# Patient Record
Sex: FEMALE | Race: WHITE
[De-identification: names, ages, dates, MRNs, and addresses within clinical notes are randomized per-mention and may not be internally consistent; named-entity substitution may affect disease eponyms.]

---

## 2017-03-25 ENCOUNTER — HOSPITAL ENCOUNTER (EMERGENCY)
Dept: HOSPITAL 17 - PHED | Age: 37
LOS: 1 days | Discharge: HOME | End: 2017-03-26
Payer: COMMERCIAL

## 2017-03-25 VITALS — WEIGHT: 106.92 LBS | HEIGHT: 62 IN | BODY MASS INDEX: 19.68 KG/M2

## 2017-03-25 VITALS
OXYGEN SATURATION: 100 % | RESPIRATION RATE: 16 BRPM | DIASTOLIC BLOOD PRESSURE: 54 MMHG | SYSTOLIC BLOOD PRESSURE: 93 MMHG | HEART RATE: 68 BPM

## 2017-03-25 VITALS
DIASTOLIC BLOOD PRESSURE: 65 MMHG | RESPIRATION RATE: 18 BRPM | SYSTOLIC BLOOD PRESSURE: 90 MMHG | TEMPERATURE: 98.3 F | OXYGEN SATURATION: 100 % | HEART RATE: 74 BPM

## 2017-03-25 DIAGNOSIS — N76.0: ICD-10-CM

## 2017-03-25 DIAGNOSIS — N73.0: Primary | ICD-10-CM

## 2017-03-25 LAB
BACTERIA #/AREA URNS HPF: (no result) /HPF
COLOR UR: YELLOW
COMMENT (UR): (no result)
CULTURE IF INDICATED: (no result)
GLUCOSE UR STRIP-MCNC: (no result) MG/DL
HGB UR QL STRIP: (no result)
KETONES UR STRIP-MCNC: (no result) MG/DL
LEUKOCYTE ESTERASE UR QL STRIP: (no result) /HPF (ref 0–5)
NITRITE UR QL STRIP: (no result)
RBC #/AREA URNS HPF: (no result) /HPF (ref 0–3)
SP GR UR STRIP: 1.02 (ref 1–1.03)
SQUAMOUS #/AREA URNS HPF: (no result) /HPF (ref 0–5)

## 2017-03-25 PROCEDURE — 87491 CHLMYD TRACH DNA AMP PROBE: CPT

## 2017-03-25 PROCEDURE — 96375 TX/PRO/DX INJ NEW DRUG ADDON: CPT

## 2017-03-25 PROCEDURE — 84703 CHORIONIC GONADOTROPIN ASSAY: CPT

## 2017-03-25 PROCEDURE — 87210 SMEAR WET MOUNT SALINE/INK: CPT

## 2017-03-25 PROCEDURE — 96365 THER/PROPH/DIAG IV INF INIT: CPT

## 2017-03-25 PROCEDURE — 99283 EMERGENCY DEPT VISIT LOW MDM: CPT

## 2017-03-25 PROCEDURE — 96367 TX/PROPH/DG ADDL SEQ IV INF: CPT

## 2017-03-25 PROCEDURE — 87591 N.GONORRHOEAE DNA AMP PROB: CPT

## 2017-03-25 PROCEDURE — 81001 URINALYSIS AUTO W/SCOPE: CPT
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
## 2017-03-26 VITALS
DIASTOLIC BLOOD PRESSURE: 55 MMHG | OXYGEN SATURATION: 100 % | SYSTOLIC BLOOD PRESSURE: 95 MMHG | HEART RATE: 74 BPM | RESPIRATION RATE: 16 BRPM

## 2017-03-26 VITALS — DIASTOLIC BLOOD PRESSURE: 74 MMHG | SYSTOLIC BLOOD PRESSURE: 128 MMHG

## 2017-03-26 LAB
CHLAMYDIA PCR: NOT DETECTED
NEISSERIA PCR: NOT DETECTED

## 2017-03-27 ENCOUNTER — HOSPITAL ENCOUNTER (EMERGENCY)
Dept: HOSPITAL 17 - PHEFT | Age: 37
Discharge: HOME | End: 2017-03-27
Payer: COMMERCIAL

## 2017-03-27 VITALS — HEIGHT: 64 IN | WEIGHT: 104.94 LBS | BODY MASS INDEX: 17.92 KG/M2

## 2017-03-27 VITALS
SYSTOLIC BLOOD PRESSURE: 87 MMHG | DIASTOLIC BLOOD PRESSURE: 61 MMHG | RESPIRATION RATE: 16 BRPM | TEMPERATURE: 98.9 F | HEART RATE: 75 BPM | OXYGEN SATURATION: 99 %

## 2017-03-27 DIAGNOSIS — N76.0: Primary | ICD-10-CM

## 2017-03-27 PROCEDURE — 99281 EMR DPT VST MAYX REQ PHY/QHP: CPT
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
## 2017-04-22 ENCOUNTER — HOSPITAL ENCOUNTER (EMERGENCY)
Dept: HOSPITAL 17 - PHED | Age: 37
LOS: 1 days | Discharge: HOME | End: 2017-04-23
Payer: COMMERCIAL

## 2017-04-22 VITALS
TEMPERATURE: 98.1 F | OXYGEN SATURATION: 99 % | SYSTOLIC BLOOD PRESSURE: 104 MMHG | RESPIRATION RATE: 18 BRPM | HEART RATE: 77 BPM | DIASTOLIC BLOOD PRESSURE: 82 MMHG

## 2017-04-22 VITALS — HEIGHT: 63 IN | BODY MASS INDEX: 18.55 KG/M2 | WEIGHT: 104.72 LBS

## 2017-04-22 DIAGNOSIS — R06.4: ICD-10-CM

## 2017-04-22 DIAGNOSIS — F31.9: ICD-10-CM

## 2017-04-22 DIAGNOSIS — F41.9: Primary | ICD-10-CM

## 2017-04-22 DIAGNOSIS — Z79.899: ICD-10-CM

## 2017-04-22 PROCEDURE — 99283 EMERGENCY DEPT VISIT LOW MDM: CPT
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
## 2017-04-23 VITALS
DIASTOLIC BLOOD PRESSURE: 64 MMHG | SYSTOLIC BLOOD PRESSURE: 92 MMHG | HEART RATE: 85 BPM | OXYGEN SATURATION: 98 % | RESPIRATION RATE: 16 BRPM

## 2017-04-23 VITALS — RESPIRATION RATE: 16 BRPM

## 2017-05-07 ENCOUNTER — HOSPITAL ENCOUNTER (EMERGENCY)
Dept: HOSPITAL 17 - PHEFT | Age: 37
Discharge: HOME | End: 2017-05-07
Payer: COMMERCIAL

## 2017-05-07 VITALS
OXYGEN SATURATION: 100 % | TEMPERATURE: 98.2 F | RESPIRATION RATE: 15 BRPM | DIASTOLIC BLOOD PRESSURE: 61 MMHG | SYSTOLIC BLOOD PRESSURE: 104 MMHG | HEART RATE: 75 BPM

## 2017-05-07 VITALS — WEIGHT: 101.41 LBS | HEIGHT: 62 IN | BODY MASS INDEX: 18.66 KG/M2

## 2017-05-07 DIAGNOSIS — Z87.442: ICD-10-CM

## 2017-05-07 DIAGNOSIS — M62.830: Primary | ICD-10-CM

## 2017-05-07 DIAGNOSIS — Z87.891: ICD-10-CM

## 2017-05-07 LAB
COLOR UR: YELLOW
COMMENT (UR): (no result)
CULTURE IF INDICATED: (no result)
GLUCOSE UR STRIP-MCNC: (no result) MG/DL
HGB UR QL STRIP: (no result)
KETONES UR STRIP-MCNC: (no result) MG/DL
LEUKOCYTE ESTERASE UR QL STRIP: (no result) /HPF (ref 0–5)
METHOD OF COLLECTION: (no result)
NITRITE UR QL STRIP: (no result)
SP GR UR STRIP: 1.02 (ref 1–1.03)
SQUAMOUS #/AREA URNS HPF: (no result) /HPF (ref 0–5)

## 2017-05-07 PROCEDURE — 84703 CHORIONIC GONADOTROPIN ASSAY: CPT

## 2017-05-07 PROCEDURE — 99283 EMERGENCY DEPT VISIT LOW MDM: CPT

## 2017-05-07 PROCEDURE — 81001 URINALYSIS AUTO W/SCOPE: CPT

## 2017-05-07 PROCEDURE — 96372 THER/PROPH/DIAG INJ SC/IM: CPT
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
## 2017-05-20 ENCOUNTER — HOSPITAL ENCOUNTER (EMERGENCY)
Dept: HOSPITAL 17 - PHEFT | Age: 37
Discharge: HOME | End: 2017-05-20
Payer: MEDICAID

## 2017-05-20 VITALS — HEIGHT: 62 IN | BODY MASS INDEX: 19.47 KG/M2 | WEIGHT: 105.82 LBS

## 2017-05-20 VITALS
DIASTOLIC BLOOD PRESSURE: 70 MMHG | RESPIRATION RATE: 15 BRPM | TEMPERATURE: 98.5 F | OXYGEN SATURATION: 100 % | SYSTOLIC BLOOD PRESSURE: 103 MMHG | HEART RATE: 76 BPM

## 2017-05-20 DIAGNOSIS — N76.0: Primary | ICD-10-CM

## 2017-05-20 DIAGNOSIS — F31.9: ICD-10-CM

## 2017-05-20 DIAGNOSIS — F41.8: ICD-10-CM

## 2017-05-20 DIAGNOSIS — Z87.891: ICD-10-CM

## 2017-05-20 PROCEDURE — 99283 EMERGENCY DEPT VISIT LOW MDM: CPT
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
## 2017-09-14 ENCOUNTER — HOSPITAL ENCOUNTER (EMERGENCY)
Dept: HOSPITAL 17 - PHEFT | Age: 37
Discharge: HOME | End: 2017-09-14
Payer: SELF-PAY

## 2017-09-14 VITALS — BODY MASS INDEX: 20.28 KG/M2 | WEIGHT: 110.23 LBS | HEIGHT: 62 IN

## 2017-09-14 VITALS
RESPIRATION RATE: 16 BRPM | TEMPERATURE: 98.6 F | DIASTOLIC BLOOD PRESSURE: 52 MMHG | OXYGEN SATURATION: 98 % | HEART RATE: 76 BPM | SYSTOLIC BLOOD PRESSURE: 99 MMHG

## 2017-09-14 DIAGNOSIS — M54.5: Primary | ICD-10-CM

## 2017-09-14 PROCEDURE — 72100 X-RAY EXAM L-S SPINE 2/3 VWS: CPT

## 2017-09-14 PROCEDURE — 96372 THER/PROPH/DIAG INJ SC/IM: CPT

## 2017-09-14 PROCEDURE — 84703 CHORIONIC GONADOTROPIN ASSAY: CPT

## 2017-09-14 PROCEDURE — 99284 EMERGENCY DEPT VISIT MOD MDM: CPT
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
## 2017-09-14 NOTE — RADRPT
EXAM DATE/TIME:  09/14/2017 10:07 

 

[facility] COMPARISON:     

No previous studies available for comparison.

 

                     

INDICATIONS :     

Low back pain.

                     

 

MEDICAL HISTORY :            

slipped disc lumbar   

 

SURGICAL HISTORY :     

None.   

 

ENCOUNTER:     

Initial                                        

 

ACUITY:     

2 days      

 

PAIN SCORE:     

10/10

 

LOCATION:     

Left  low back

 

FINDINGS:     

Two view examination was performed.  There are five non-rib bearing vertebral bodies.  The vertebral 
bodies are in normal alignment without evidence of subluxation or scoliosis.  The disc spaces are sarah
ntained.  The pedicles are intact.  Bony mineralization is normal.  No fracture is identified.

 

CONCLUSION:     

Unremarkable limited examination of the lumbar spine.  

 

 

 

 Richy Young MD on September 14, 2017 at 10:24           

Board Certified Radiologist.

 This report was verified electronically.

## 2017-10-18 ENCOUNTER — HOSPITAL ENCOUNTER (EMERGENCY)
Dept: HOSPITAL 17 - PHED | Age: 37
Discharge: HOME | End: 2017-10-18
Payer: SELF-PAY

## 2017-10-18 VITALS — SYSTOLIC BLOOD PRESSURE: 86 MMHG | DIASTOLIC BLOOD PRESSURE: 53 MMHG

## 2017-10-18 VITALS
SYSTOLIC BLOOD PRESSURE: 99 MMHG | DIASTOLIC BLOOD PRESSURE: 53 MMHG | OXYGEN SATURATION: 100 % | HEART RATE: 72 BPM | TEMPERATURE: 97.8 F

## 2017-10-18 DIAGNOSIS — N12: Primary | ICD-10-CM

## 2017-10-18 DIAGNOSIS — B95.7: ICD-10-CM

## 2017-10-18 DIAGNOSIS — Z87.891: ICD-10-CM

## 2017-10-18 DIAGNOSIS — F31.9: ICD-10-CM

## 2017-10-18 LAB
BACTERIA #/AREA URNS HPF: (no result) /HPF
COLOR UR: (no result)
COMMENT (UR): (no result)
CULTURE IF INDICATED: (no result)
GLUCOSE UR STRIP-MCNC: 250 MG/DL
HGB UR QL STRIP: (no result)
KETONES UR STRIP-MCNC: 15 MG/DL
NITRITE UR QL STRIP: (no result)
RBC #/AREA URNS HPF: (no result) /HPF (ref 0–3)
SP GR UR STRIP: 1.03 (ref 1–1.03)
SQUAMOUS #/AREA URNS HPF: (no result) /HPF (ref 0–5)

## 2017-10-18 PROCEDURE — 87086 URINE CULTURE/COLONY COUNT: CPT

## 2017-10-18 PROCEDURE — 87077 CULTURE AEROBIC IDENTIFY: CPT

## 2017-10-18 PROCEDURE — 87185 SC STD ENZYME DETCJ PER NZM: CPT

## 2017-10-18 PROCEDURE — 84703 CHORIONIC GONADOTROPIN ASSAY: CPT

## 2017-10-18 PROCEDURE — 99283 EMERGENCY DEPT VISIT LOW MDM: CPT

## 2017-10-18 PROCEDURE — 87186 SC STD MICRODIL/AGAR DIL: CPT

## 2017-10-18 PROCEDURE — 81001 URINALYSIS AUTO W/SCOPE: CPT
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
## 2017-11-14 ENCOUNTER — HOSPITAL ENCOUNTER (EMERGENCY)
Dept: HOSPITAL 17 - PHED | Age: 37
LOS: 1 days | Discharge: HOME | End: 2017-11-15
Payer: SELF-PAY

## 2017-11-14 VITALS — WEIGHT: 116.4 LBS | BODY MASS INDEX: 21.42 KG/M2 | HEIGHT: 62 IN

## 2017-11-14 VITALS
HEART RATE: 67 BPM | RESPIRATION RATE: 15 BRPM | SYSTOLIC BLOOD PRESSURE: 119 MMHG | TEMPERATURE: 98.5 F | DIASTOLIC BLOOD PRESSURE: 60 MMHG

## 2017-11-14 DIAGNOSIS — N89.8: Primary | ICD-10-CM

## 2017-11-14 PROCEDURE — 87591 N.GONORRHOEAE DNA AMP PROB: CPT

## 2017-11-14 PROCEDURE — 87491 CHLMYD TRACH DNA AMP PROBE: CPT

## 2017-11-14 PROCEDURE — 87210 SMEAR WET MOUNT SALINE/INK: CPT

## 2017-11-14 PROCEDURE — 99283 EMERGENCY DEPT VISIT LOW MDM: CPT
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
## 2017-11-15 VITALS
HEART RATE: 85 BPM | SYSTOLIC BLOOD PRESSURE: 120 MMHG | TEMPERATURE: 98.2 F | RESPIRATION RATE: 16 BRPM | OXYGEN SATURATION: 97 % | DIASTOLIC BLOOD PRESSURE: 78 MMHG

## 2017-11-15 LAB
CHLAMYDIA PCR: NOT DETECTED
NEISSERIA PCR: NOT DETECTED

## 2018-01-02 ENCOUNTER — HOSPITAL ENCOUNTER (EMERGENCY)
Dept: HOSPITAL 17 - PHED | Age: 38
Discharge: HOME | End: 2018-01-02
Payer: MEDICAID

## 2018-01-02 VITALS
SYSTOLIC BLOOD PRESSURE: 99 MMHG | RESPIRATION RATE: 16 BRPM | DIASTOLIC BLOOD PRESSURE: 56 MMHG | OXYGEN SATURATION: 100 % | HEART RATE: 87 BPM | TEMPERATURE: 98 F

## 2018-01-02 VITALS — BODY MASS INDEX: 20.37 KG/M2 | WEIGHT: 110.67 LBS | HEIGHT: 62 IN

## 2018-01-02 DIAGNOSIS — B95.7: ICD-10-CM

## 2018-01-02 DIAGNOSIS — F17.210: ICD-10-CM

## 2018-01-02 DIAGNOSIS — N39.0: Primary | ICD-10-CM

## 2018-01-02 DIAGNOSIS — I10: ICD-10-CM

## 2018-01-02 LAB
AMORPHOUS SEDIMENT, URINE: (no result)
BACTERIA #/AREA URNS HPF: (no result) /HPF
COLOR UR: YELLOW
GLUCOSE UR STRIP-MCNC: (no result) MG/DL
HGB UR QL STRIP: (no result)
KETONES UR STRIP-MCNC: 15 MG/DL
LEUKOCYTE ESTERASE UR QL STRIP: (no result) /HPF (ref 0–5)
MUCOUS THREADS #/AREA URNS LPF: (no result) /LPF
NITRITE UR QL STRIP: (no result)
SP GR UR STRIP: 1.02 (ref 1–1.03)
SQUAMOUS #/AREA URNS HPF: (no result) /HPF (ref 0–5)
URINE LEUKOCYTE ESTERASE: (no result)
WHITE BLOOD CELL CLUMPS: (no result)

## 2018-01-02 PROCEDURE — 86403 PARTICLE AGGLUT ANTBDY SCRN: CPT

## 2018-01-02 PROCEDURE — 84703 CHORIONIC GONADOTROPIN ASSAY: CPT

## 2018-01-02 PROCEDURE — 99284 EMERGENCY DEPT VISIT MOD MDM: CPT

## 2018-01-02 PROCEDURE — 87086 URINE CULTURE/COLONY COUNT: CPT

## 2018-01-02 PROCEDURE — 87186 SC STD MICRODIL/AGAR DIL: CPT

## 2018-01-02 PROCEDURE — 87077 CULTURE AEROBIC IDENTIFY: CPT

## 2018-01-02 PROCEDURE — 81001 URINALYSIS AUTO W/SCOPE: CPT
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
## 2018-01-23 ENCOUNTER — HOSPITAL ENCOUNTER (EMERGENCY)
Dept: HOSPITAL 17 - PHED | Age: 38
Discharge: HOME | End: 2018-01-23
Payer: MEDICAID

## 2018-01-23 VITALS
RESPIRATION RATE: 18 BRPM | HEART RATE: 78 BPM | DIASTOLIC BLOOD PRESSURE: 62 MMHG | OXYGEN SATURATION: 99 % | SYSTOLIC BLOOD PRESSURE: 99 MMHG

## 2018-01-23 VITALS
HEART RATE: 96 BPM | TEMPERATURE: 97.6 F | RESPIRATION RATE: 14 BRPM | DIASTOLIC BLOOD PRESSURE: 59 MMHG | OXYGEN SATURATION: 100 % | SYSTOLIC BLOOD PRESSURE: 101 MMHG

## 2018-01-23 VITALS — HEIGHT: 62 IN | WEIGHT: 111.55 LBS | BODY MASS INDEX: 20.53 KG/M2

## 2018-01-23 VITALS — SYSTOLIC BLOOD PRESSURE: 96 MMHG | DIASTOLIC BLOOD PRESSURE: 58 MMHG

## 2018-01-23 VITALS — RESPIRATION RATE: 18 BRPM

## 2018-01-23 DIAGNOSIS — N39.0: Primary | ICD-10-CM

## 2018-01-23 DIAGNOSIS — F17.200: ICD-10-CM

## 2018-01-23 LAB
ALBUMIN SERPL-MCNC: 3.3 GM/DL (ref 3.4–5)
ALP SERPL-CCNC: 61 U/L (ref 45–117)
ALT SERPL-CCNC: 19 U/L (ref 10–53)
AST SERPL-CCNC: 17 U/L (ref 15–37)
BACTERIA #/AREA URNS HPF: (no result) /HPF
BASOPHILS # BLD AUTO: 0 TH/MM3 (ref 0–0.2)
BASOPHILS NFR BLD: 0.3 % (ref 0–2)
BILIRUB SERPL-MCNC: 0.6 MG/DL (ref 0.2–1)
BUN SERPL-MCNC: 17 MG/DL (ref 7–18)
CALCIUM SERPL-MCNC: 8.5 MG/DL (ref 8.5–10.1)
CHLORIDE SERPL-SCNC: 110 MEQ/L (ref 98–107)
COLOR UR: YELLOW
CREAT SERPL-MCNC: 0.73 MG/DL (ref 0.5–1)
EOSINOPHIL # BLD: 0.1 TH/MM3 (ref 0–0.4)
EOSINOPHIL NFR BLD: 0.6 % (ref 0–4)
ERYTHROCYTE [DISTWIDTH] IN BLOOD BY AUTOMATED COUNT: 12.1 % (ref 11.6–17.2)
GFR SERPLBLD BASED ON 1.73 SQ M-ARVRAT: 91 ML/MIN (ref 89–?)
GLUCOSE SERPL-MCNC: 84 MG/DL (ref 74–106)
GLUCOSE UR STRIP-MCNC: (no result) MG/DL
HCO3 BLD-SCNC: 24.6 MEQ/L (ref 21–32)
HCT VFR BLD CALC: 36.3 % (ref 35–46)
HGB BLD-MCNC: 11.9 GM/DL (ref 11.6–15.3)
HGB UR QL STRIP: (no result)
KETONES UR STRIP-MCNC: (no result) MG/DL
LIPASE: 135 U/L (ref 73–393)
LYMPHOCYTES # BLD AUTO: 0.9 TH/MM3 (ref 1–4.8)
LYMPHOCYTES NFR BLD AUTO: 9.8 % (ref 9–44)
MCH RBC QN AUTO: 31.2 PG (ref 27–34)
MCHC RBC AUTO-ENTMCNC: 32.8 % (ref 32–36)
MCV RBC AUTO: 95.2 FL (ref 80–100)
MONOCYTE #: 0.4 TH/MM3 (ref 0–0.9)
MONOCYTES NFR BLD: 4.3 % (ref 0–8)
NEUTROPHILS # BLD AUTO: 8.3 TH/MM3 (ref 1.8–7.7)
NEUTROPHILS NFR BLD AUTO: 85 % (ref 16–70)
NITRITE UR QL STRIP: (no result)
PLATELET # BLD: 174 TH/MM3 (ref 150–450)
PMV BLD AUTO: 8.7 FL (ref 7–11)
PROT SERPL-MCNC: 6.8 GM/DL (ref 6.4–8.2)
RBC # BLD AUTO: 3.81 MIL/MM3 (ref 4–5.3)
SODIUM SERPL-SCNC: 141 MEQ/L (ref 136–145)
SP GR UR STRIP: 1.02 (ref 1–1.03)
SQUAMOUS #/AREA URNS HPF: (no result) /HPF (ref 0–5)
URINE LEUKOCYTE ESTERASE: (no result)
WBC # BLD AUTO: 9.7 TH/MM3 (ref 4–11)
WHITE BLOOD CELL CLUMPS: (no result)

## 2018-01-23 PROCEDURE — 74176 CT ABD & PELVIS W/O CONTRAST: CPT

## 2018-01-23 PROCEDURE — 99284 EMERGENCY DEPT VISIT MOD MDM: CPT

## 2018-01-23 PROCEDURE — 96361 HYDRATE IV INFUSION ADD-ON: CPT

## 2018-01-23 PROCEDURE — 87086 URINE CULTURE/COLONY COUNT: CPT

## 2018-01-23 PROCEDURE — 96375 TX/PRO/DX INJ NEW DRUG ADDON: CPT

## 2018-01-23 PROCEDURE — 85025 COMPLETE CBC W/AUTO DIFF WBC: CPT

## 2018-01-23 PROCEDURE — 96374 THER/PROPH/DIAG INJ IV PUSH: CPT

## 2018-01-23 PROCEDURE — 80053 COMPREHEN METABOLIC PANEL: CPT

## 2018-01-23 PROCEDURE — 81001 URINALYSIS AUTO W/SCOPE: CPT

## 2018-01-23 PROCEDURE — 84703 CHORIONIC GONADOTROPIN ASSAY: CPT

## 2018-01-23 PROCEDURE — 83690 ASSAY OF LIPASE: CPT

## 2018-01-23 NOTE — RADRPT
EXAM DATE/TIME:  2018 07:56 

 

[facility] COMPARISON:     

CT ABDOMEN & PELVIS W/O CONTRAST, 2016, 21:06.

 

 

INDICATIONS :     

Left flank pain with nausea. Evaluate for renal calculi.

                  

 

ORAL CONTRAST:      

No oral contrast ingested.

                  

 

RADIATION DOSE:     

5.86 CTDIvol (mGy) 

 

 

MEDICAL HISTORY :     

Hypertension.  

 

SURGICAL HISTORY :      

 section. Tubal ligation.

 

ENCOUNTER:      

Initial

 

ACUITY:      

1 day

 

PAIN SCALE:      

10/10

 

LOCATION:       

Left flank 

 

TECHNIQUE:     

Volumetric scanning of the abdomen and pelvis was performed.  Using automated exposure control and ad
justment of the mA and/or kV according to patient size, radiation dose was kept as low as reasonably 
achievable to obtain optimal diagnostic quality images.  DICOM format image data is available electro
nically for review and comparison.  

 

FINDINGS:     

 

LOWER LUNGS:     

The visualized lower lungs are clear.

 

LIVER:     

Homogeneous density without lesion.  There is no dilation of the biliary tree.  No calcified gallston
es.

 

SPLEEN:     

Normal size without lesion.

 

PANCREAS:     

Within normal limits. 

 

KIDNEYS:     

Redemonstration of bilateral hyperdense renal pyramids. Multiple 2-3 mm calcified renal calculi in th
e right kidney. There is described left sided renal calculi are not demonstrated on current exam. No 
hydronephrosis. No contour deforming abnormality.

 

ADRENAL GLANDS:     

Within normal limits.

 

VASCULAR:     

There is no aortic aneurysm.

 

BOWEL/MESENTERY:     

The stomach, small bowel, and colon demonstrate no acute abnormality.  There is no free intraperitone
al air or fluid. 

 

ABDOMINAL WALL:     

Within normal limits.

 

RETROPERITONEUM:     

There is no lymphadenopathy.

 

BLADDER:     

Nearly completely decompressed. No radiopaque bladder calculi.

 

REPRODUCTIVE:     

Uterus appears retroflexed.

 

INGUINAL:     

There is no lymphadenopathy or hernia.

 

MUSCULOSKELETAL:     

Within normal limits for patient age.

 

CONCLUSION:

     

1. Findings suggestive of medullary nephrocalcinosis with multiple 2-3 mm nonobstructing calcified ri
ght renal calculi. No radiopaque left renal calculi or obstructive uropathy.

2. No definitive findings to explain patient's left abdominal pain. 

 

 

 Dwayne Carroll MD on 2018 at 8:10           

Board Certified Radiologist.

 This report was verified electronically.

## 2018-01-23 NOTE — PD
HPI


Chief Complaint:  LEFT FLANK PAIN


Time Seen by Provider:  06:38


Travel History


International Travel<30 days:  No


Contact w/Intl Traveler<30days:  No


Traveled to known affect area:  No





History of Present Illness


HPI


PATIENT STATES LEFT FLANK PAIN ABOUT 1.5HRS AGO, ACUTE/SUDDEN ONSET, 9/10, NO 

CHANGES TO URINE COLOR/FREQUENCY/DYSURIA AT THIS POINT.  





ALL:TYLENOL,DOXY, PAXIL,DARVON


PMHX: KIDNEY STONES, PSYCH HX, HTN , CSEXNS





PFSH


Past Medical History


Bipolar Disorder:  Yes


Anxiety:  Yes (PANIC ATTACKS)


Depression:  Yes


Cancer:  No


Cardiovascular Problems:  Yes (HTN)


Diabetes:  No


Diminished Hearing:  No


Headaches:  No


Psychiatric:  Yes


Immunizations Current:  Yes


Migraines:  Yes


Seizures:  No


:  5


Para:  4


Miscarriage:  1


:  0


Tubal Ligation:  Yes





Past Surgical History


 Section:  Yes (X1)


Gynecologic Surgery:  Yes (c-sections)


Other Surgery:  Yes (L LEG RECONSTRUCTION AFTER TRAUMA )





Social History


Alcohol Use:  No


Tobacco Use:  Yes (1PPD)


Substance Use:  No





Allergies-Medications


(Allergen,Severity, Reaction):  


Coded Allergies:  


     acetaminophen (Verified  Allergy, Severe, 18)


     doxycycline (Verified  Allergy, Severe, Rash, 18)


     paroxetine (Verified  Allergy, Severe, rash, 18)


     propoxyphene (Verified  Allergy, Severe, 18)


Reported Meds & Prescriptions





Reported Meds & Active Scripts


Active


Macrobid (Nitrofurantoin Monoh/Nitrofur Macro) 100 Mg Cap 100 Mg PO BID 7 Days








Review of Systems


Except as stated in HPI:  all other systems reviewed are Neg


General / Constitutional:  No: Fever


Eyes:  No: Visual changes


HENT:  No: Headaches


Cardiovascular:  No: Chest Pain or Discomfort


Respiratory:  No: Shortness of Breath


Gastrointestinal:  No: Abdominal Pain


Genitourinary:  Positive: Flank Pain


Musculoskeletal:  No: Pain


Skin:  No Rash


Neurologic:  No: Weakness


Psychiatric:  No: Depression


Endocrine:  No: Polydipsia


Hematologic/Lymphatic:  No: Easy Bruising





Physical Exam


Narrative


GENERAL: 


SKIN: Warm and dry.


HEAD: Atraumatic. Normocephalic. 


EYES: Pupils equal and round. No scleral icterus. No injection or drainage. 


ENT: No nasal bleeding or discharge.  Mucous membranes pink and moist.


NECK: Trachea midline. No JVD. 


CARDIOVASCULAR: Regular rate and rhythm.  


RESPIRATORY: No accessory muscle use. Clear to auscultation. Breath sounds 

equal bilaterally. 


GASTROINTESTINAL: Abdomen soft, non-tender, nondistended. LEFT CVAT


MUSCULOSKELETAL: Extremities without clubbing, cyanosis, or edema. No obvious 

deformities. 


NEUROLOGICAL: Awake and alert. No obvious cranial nerve deficits.  Motor 

grossly within normal limits. Five out of 5 muscle strength in the arms and 

legs.  Normal speech.


PSYCHIATRIC: Appropriate mood and affect; insight and judgment normal.





Data


Data


Last Documented VS





Orders





 Orders


Complete Blood Count With Diff (18 06:42)


Comprehensive Metabolic Panel (18 06:42)


Urinalysis - C+S If Indicated (18 06:42)


Ed Urine Pregnancytest Poc (18 06:42)


Ct Abd/Pel W/O Iv Contrast (18 06:42)


Ecg Monitoring (18 06:42)


Iv Access Insert/Monitor (18 06:42)


Ketorolac Inj (Toradol Inj) (18 06:45)


Ondansetron Inj (Zofran Inj) (18 06:45)


Sodium Chlor 0.9% 1000 Ml Inj (Ns 1000 M (18 06:42)


Lipase (18 06:42)


Sodium Chlor 0.9% 1000 Ml Inj (Ns 1000 M (18 07:45)


Urine Culture (18 07:53)


Ed Discharge Order (18 08:41)





Labs





Laboratory Tests








Test


  18


07:00 18


07:53


 


White Blood Count 9.7 TH/MM3  


 


Red Blood Count 3.81 MIL/MM3  


 


Hemoglobin 11.9 GM/DL  


 


Hematocrit 36.3 %  


 


Mean Corpuscular Volume 95.2 FL  


 


Mean Corpuscular Hemoglobin 31.2 PG  


 


Mean Corpuscular Hemoglobin


Concent 32.8 % 


  


 


 


Red Cell Distribution Width 12.1 %  


 


Platelet Count 174 TH/MM3  


 


Mean Platelet Volume 8.7 FL  


 


Neutrophils (%) (Auto) 85.0 %  


 


Lymphocytes (%) (Auto) 9.8 %  


 


Monocytes (%) (Auto) 4.3 %  


 


Eosinophils (%) (Auto) 0.6 %  


 


Basophils (%) (Auto) 0.3 %  


 


Neutrophils # (Auto) 8.3 TH/MM3  


 


Lymphocytes # (Auto) 0.9 TH/MM3  


 


Monocytes # (Auto) 0.4 TH/MM3  


 


Eosinophils # (Auto) 0.1 TH/MM3  


 


Basophils # (Auto) 0.0 TH/MM3  


 


CBC Comment DIFF FINAL  


 


Differential Comment   


 


Blood Urea Nitrogen 17 MG/DL  


 


Creatinine 0.73 MG/DL  


 


Random Glucose 84 MG/DL  


 


Total Protein 6.8 GM/DL  


 


Albumin 3.3 GM/DL  


 


Calcium Level 8.5 MG/DL  


 


Alkaline Phosphatase 61 U/L  


 


Aspartate Amino Transf


(AST/SGOT) 17 U/L 


  


 


 


Alanine Aminotransferase


(ALT/SGPT) 19 U/L 


  


 


 


Total Bilirubin 0.6 MG/DL  


 


Sodium Level 141 MEQ/L  


 


Potassium Level 3.7 MEQ/L  


 


Chloride Level 110 MEQ/L  


 


Carbon Dioxide Level 24.6 MEQ/L  


 


Anion Gap 6 MEQ/L  


 


Estimat Glomerular Filtration


Rate 91 ML/MIN 


  


 


 


Lipase 135 U/L  


 


Urine Collection Type  CLEAN CATCH 


 


Urine Color  YELLOW 


 


Urine Turbidity  SLIGHT 


 


Urine pH  6.0 


 


Urine Specific Gravity  1.020 


 


Urine Protein  30 mg/dL 


 


Urine Glucose (UA)  NEG mg/dL 


 


Urine Ketones  NEG mg/dL 


 


Urine Occult Blood  LARGE 


 


Urine Nitrite  NEG 


 


Urine Bilirubin  NEG 


 


Urine Leukocyte Esterase  MOD 


 


Urine RBC  50-99 /hpf 


 


Urine WBC  25-49 /hpf 


 


Urine WBC Clumps  FEW 


 


Urine Squamous Epithelial


Cells 


  0-5 /hpf 


 


 


Urine Bacteria  FEW /hpf 


 


Urine Waxy Casts   /lpf 


 


Microscopic Urinalysis Comment


  


  CULTURE


INDICATED


 


Urine Collection Time  07:53 











MDM


Medical Decision Making


Medical Screen Exam Complete:  Yes


Emergency Medical Condition:  Yes


Medical Record Reviewed:  Yes


Differential Diagnosis


KIDNEY STONE V PYELO V UTI


Narrative Course


SIGN OUT TO DR NARVAEZ PENDING CT/UA/LABS AND REEVALUATION/DISPO





Diagnosis





 Primary Impression:  


 flank pain


Scripts


Nitrofurantoin Monohydrate Macrocrystals (Macrobid) 100 Mg Cap


100 MG PO BID for Infection for 7 Days, #14 CAP 0 Refills


   Prov: Yulisa Narvaez MD         18











Altaf Ness MD 2018 06:42

## 2018-01-23 NOTE — PD
Physical Exam


Narrative


GENERAL: Well-nourished, well-developed patient.


SKIN: Warm and dry.


HEAD: Normocephalic and atraumatic.


EYES: No injection or drainage. 


ENT: No nasal drainage noted. 


NECK: Supple, trachea midline.


CARDIOVASCULAR: Regular rate and rhythm 


RESPIRATORY: no increased effort. No accessory muscle use.


GASTROINTESTINAL: Abdomen nondistended. 


EXTREMITIES: No edema.


NEUROLOGICAL: Awake and alert. Motor and sensory grossly within normal limits. 

Normal speech.





Data


Data


Last Documented VS





Vital Signs








  Date Time  Temp Pulse Resp B/P (MAP) Pulse Ox O2 Delivery O2 Flow Rate FiO2


 


1/23/18 08:05   18     


 


1/23/18 07:09  78  99/62 (74) 99 Room Air  


 


1/23/18 06:03 97.6       








Orders





 Orders


Complete Blood Count With Diff (1/23/18 06:42)


Comprehensive Metabolic Panel (1/23/18 06:42)


Urinalysis - C+S If Indicated (1/23/18 06:42)


Ed Urine Pregnancytest Poc (1/23/18 06:42)


Ct Abd/Pel W/O Iv Contrast (1/23/18 06:42)


Ecg Monitoring (1/23/18 06:42)


Iv Access Insert/Monitor (1/23/18 06:42)


Ketorolac Inj (Toradol Inj) (1/23/18 06:45)


Ondansetron Inj (Zofran Inj) (1/23/18 06:45)


Sodium Chlor 0.9% 1000 Ml Inj (Ns 1000 M (1/23/18 06:42)


Lipase (1/23/18 06:42)


Sodium Chlor 0.9% 1000 Ml Inj (Ns 1000 M (1/23/18 07:45)


Urine Culture (1/23/18 07:53)


Ed Discharge Order (1/23/18 08:41)





Labs





Laboratory Tests








Test


  1/23/18


07:00 1/23/18


07:53


 


White Blood Count 9.7 TH/MM3  


 


Red Blood Count 3.81 MIL/MM3  


 


Hemoglobin 11.9 GM/DL  


 


Hematocrit 36.3 %  


 


Mean Corpuscular Volume 95.2 FL  


 


Mean Corpuscular Hemoglobin 31.2 PG  


 


Mean Corpuscular Hemoglobin


Concent 32.8 % 


  


 


 


Red Cell Distribution Width 12.1 %  


 


Platelet Count 174 TH/MM3  


 


Mean Platelet Volume 8.7 FL  


 


Neutrophils (%) (Auto) 85.0 %  


 


Lymphocytes (%) (Auto) 9.8 %  


 


Monocytes (%) (Auto) 4.3 %  


 


Eosinophils (%) (Auto) 0.6 %  


 


Basophils (%) (Auto) 0.3 %  


 


Neutrophils # (Auto) 8.3 TH/MM3  


 


Lymphocytes # (Auto) 0.9 TH/MM3  


 


Monocytes # (Auto) 0.4 TH/MM3  


 


Eosinophils # (Auto) 0.1 TH/MM3  


 


Basophils # (Auto) 0.0 TH/MM3  


 


CBC Comment DIFF FINAL  


 


Differential Comment   


 


Blood Urea Nitrogen 17 MG/DL  


 


Creatinine 0.73 MG/DL  


 


Random Glucose 84 MG/DL  


 


Total Protein 6.8 GM/DL  


 


Albumin 3.3 GM/DL  


 


Calcium Level 8.5 MG/DL  


 


Alkaline Phosphatase 61 U/L  


 


Aspartate Amino Transf


(AST/SGOT) 17 U/L 


  


 


 


Alanine Aminotransferase


(ALT/SGPT) 19 U/L 


  


 


 


Total Bilirubin 0.6 MG/DL  


 


Sodium Level 141 MEQ/L  


 


Potassium Level 3.7 MEQ/L  


 


Chloride Level 110 MEQ/L  


 


Carbon Dioxide Level 24.6 MEQ/L  


 


Anion Gap 6 MEQ/L  


 


Estimat Glomerular Filtration


Rate 91 ML/MIN 


  


 


 


Lipase 135 U/L  


 


Urine Collection Type  CLEAN CATCH 


 


Urine Color  YELLOW 


 


Urine Turbidity  SLIGHT 


 


Urine pH  6.0 


 


Urine Specific Gravity  1.020 


 


Urine Protein  30 mg/dL 


 


Urine Glucose (UA)  NEG mg/dL 


 


Urine Ketones  NEG mg/dL 


 


Urine Occult Blood  LARGE 


 


Urine Nitrite  NEG 


 


Urine Bilirubin  NEG 


 


Urine Leukocyte Esterase  MOD 


 


Urine RBC  50-99 /hpf 


 


Urine WBC  25-49 /hpf 


 


Urine WBC Clumps  FEW 


 


Urine Squamous Epithelial


Cells 


  0-5 /hpf 


 


 


Urine Bacteria  FEW /hpf 


 


Urine Waxy Casts   /lpf 


 


Microscopic Urinalysis Comment


  


  CULTURE


INDICATED


 


Urine Collection Time  07:53 











MDM


Supervised Visit with [facility]:  No


Interpretation(s)


CBC & BMP Diagram


1/23/18 07:00








Total Protein 6.8, Albumin 3.3 L, Calcium Level 8.5, Alkaline Phosphatase 61, 

Aspartate Amino Transf (AST/SGOT) 17, Alanine Aminotransferase (ALT/SGPT) 19, 

Total Bilirubin 0.6





beta is negative


CT abdomen and pelvis without ureteral stones


UA with UTI


Narrative Course


Signed over to me to follow workup and reevaluate.  Workup shows UTI, Patient 

denies any new complaints and states that they are feeling better.  Patient 

happy with care, all questions answered. Patient knows that follow up is 

incumbent on them and to return to the emergency room immediately if new or 

worsening symptoms develop. Patient given strict return precautions, vitals 

reviewed and are normal, agrees to further workup as an outpatient.


Diagnosis





 Primary Impression:  


 UTI (urinary tract infection)


 Qualified Codes:  N39.0 - Urinary tract infection, site not specified


 Additional Impression:  


 Abdominal pain


 Qualified Codes:  R10.9 - Unspecified abdominal pain


Patient Instructions:  General Instructions





***Additional Instruction:  


return as needed, follow with primary this week, tylenol as needed for pain


***Med/Other Pt SpecificInfo:  Prescription(s) given


Scripts


Nitrofurantoin Monohydrate Macrocrystals (Macrobid) 100 Mg Cap


100 MG PO BID for Infection for 7 Days, #14 CAP 0 Refills


   Prov: Yulisa Narvaez MD         1/23/18


Disposition:  01 DISCHARGE HOME


Condition:  Stable











Yulisa Narvaez MD Jan 23, 2018 08:05

## 2018-02-13 ENCOUNTER — HOSPITAL ENCOUNTER (EMERGENCY)
Dept: HOSPITAL 17 - PHED | Age: 38
Discharge: HOME | End: 2018-02-13
Payer: MEDICAID

## 2018-02-13 VITALS — DIASTOLIC BLOOD PRESSURE: 50 MMHG | SYSTOLIC BLOOD PRESSURE: 91 MMHG

## 2018-02-13 VITALS
RESPIRATION RATE: 18 BRPM | SYSTOLIC BLOOD PRESSURE: 94 MMHG | DIASTOLIC BLOOD PRESSURE: 58 MMHG | HEART RATE: 73 BPM | TEMPERATURE: 98.9 F | OXYGEN SATURATION: 100 %

## 2018-02-13 VITALS — BODY MASS INDEX: 20.49 KG/M2 | WEIGHT: 111.33 LBS | HEIGHT: 62 IN

## 2018-02-13 DIAGNOSIS — F31.9: ICD-10-CM

## 2018-02-13 DIAGNOSIS — F17.210: ICD-10-CM

## 2018-02-13 DIAGNOSIS — I10: ICD-10-CM

## 2018-02-13 DIAGNOSIS — N93.9: Primary | ICD-10-CM

## 2018-02-13 DIAGNOSIS — F41.0: ICD-10-CM

## 2018-02-13 LAB
COLOR UR: YELLOW
GLUCOSE UR STRIP-MCNC: (no result) MG/DL
HGB UR QL STRIP: (no result)
KETONES UR STRIP-MCNC: (no result) MG/DL
LEUKOCYTE ESTERASE UR QL STRIP: (no result) /HPF (ref 0–5)
NITRITE UR QL STRIP: (no result)
SP GR UR STRIP: 1.02 (ref 1–1.03)
SQUAMOUS #/AREA URNS HPF: (no result) /HPF (ref 0–5)
URINE LEUKOCYTE ESTERASE: (no result)

## 2018-02-13 PROCEDURE — 84703 CHORIONIC GONADOTROPIN ASSAY: CPT

## 2018-02-13 PROCEDURE — 99284 EMERGENCY DEPT VISIT MOD MDM: CPT

## 2018-02-13 PROCEDURE — 81001 URINALYSIS AUTO W/SCOPE: CPT

## 2018-02-13 NOTE — PD
HPI


Chief Complaint:  Gyn Problem/Complaint


Time Seen by Provider:  17:37


Travel History


International Travel<30 days:  No


Contact w/Intl Traveler<30days:  No


Traveled to known affect area:  No





History of Present Illness


HPI


The patient's 38 years old and arrives to the ER complaining of vaginal 

bleeding.  Last menstruation ended about 5 days ago and one day after and then 

she began bleeding again requiring about 1-1/2 pads today.  She reports this to 

be very abnormal for her no symptoms consistent with acute anemia.  No similar 

prior episodes.  No dysuria or frequency or discharge.  No pain.  Patient 

denies any possibility of intrauterine pregnancy.





PFSH


Past Medical History


Bipolar Disorder:  Yes


Anxiety:  Yes (PANIC ATTACKS)


Depression:  Yes


Cancer:  No


Cardiovascular Problems:  Yes (HTN)


Diabetes:  No


Diminished Hearing:  No


Headaches:  No


Kidney Stones:  Yes


Musculoskeletal:  Yes (Slipped disc in back )


Psychiatric:  Yes


Immunizations Current:  Yes


Migraines:  Yes


Seizures:  No


Pregnant?:  Not Pregnant


:  5


Para:  4


Miscarriage:  1


:  0


Tubal Ligation:  Yes





Past Surgical History


 Section:  Yes (X1)


Gynecologic Surgery:  Yes (c-sections)


Other Surgery:  Yes (L LEG RECONSTRUCTION AFTER TRAUMA )





Social History


Alcohol Use:  No


Tobacco Use:  Yes (1PPD)


Substance Use:  No





Allergies-Medications


(Allergen,Severity, Reaction):  


Coded Allergies:  


     acetaminophen (Verified  Allergy, Severe, 18)


     doxycycline (Verified  Allergy, Severe, Rash, 18)


     paroxetine (Verified  Allergy, Severe, rash, 18)


     propoxyphene (Verified  Allergy, Severe, 18)


Reported Meds & Prescriptions





Reported Meds & Active Scripts


Active


Macrobid (Nitrofurantoin Monoh/Nitrofur Macro) 100 Mg Cap 100 Mg PO BID 7 Days








Review of Systems


Except as stated in HPI:  all other systems reviewed are Neg





Physical Exam


Narrative


GENERAL: 38-year-old female pleasant well-nourished well-developed no acute 

distress





Vital Signs








  Date Time  Temp Pulse Resp B/P (MAP) Pulse Ox O2 Delivery O2 Flow Rate FiO2


 


18 16:42 98.9 73 18 94/58 (70) 100   








SKIN: Warm and dry.


HEAD: Atraumatic. Normocephalic. 


EYES: Pupils equal and round. No scleral icterus. No injection or drainage. 


ENT: No nasal bleeding or discharge.  Mucous membranes pink and moist.


NECK: Trachea midline. No JVD. 


CARDIOVASCULAR: Regular rate and rhythm.  


RESPIRATORY: No accessory muscle use. Clear to auscultation. Breath sounds 

equal bilaterally. 


GASTROINTESTINAL: Soft.  No focus of tenderness.  No suprapubic tenderness or 

distention.


MUSCULOSKELETAL: Extremities without clubbing, cyanosis, or edema. No obvious 

deformities. 


NEUROLOGICAL: Awake and alert. No obvious cranial nerve deficits.  Motor 

grossly within normal limits. Five out of 5 muscle strength in the arms and 

legs.  Normal speech.


PSYCHIATRIC: Appropriate mood and affect; insight and judgment normal.





Data


Data


Last Documented VS





Vital Signs








  Date Time  Temp Pulse Resp B/P (MAP) Pulse Ox O2 Delivery O2 Flow Rate FiO2


 


18 16:42 98.9 73 18 94/58 (70) 100   





Vital signs reviewed


Orders





 Orders


Urinalysis - C+S If Indicated (18 18:05)


Ed Urine Pregnancytest Poc (18 18:05)





Labs





Laboratory Tests








Test


  18


18:14


 


Urine Collection Type CLEAN CATCH 


 


Urine Color YELLOW 


 


Urine Turbidity CLEAR 


 


Urine pH 6.5 


 


Urine Specific Gravity 1.020 


 


Urine Protein NEG mg/dL 


 


Urine Glucose (UA) NEG mg/dL 


 


Urine Ketones TRACE mg/dL 


 


Urine Occult Blood LARGE 


 


Urine Nitrite NEG 


 


Urine Bilirubin NEG 


 


Urine Leukocyte Esterase NEG 


 


Urine RBC 10-14 /hpf 


 


Urine WBC 0-2 /hpf 


 


Urine Squamous Epithelial


Cells 6-8 /hpf 


 


 


Microscopic Urinalysis Comment


  CULT NOT


INDICATED


 


Urine Collection Time 18:14 











[facility]


Medical Decision Making


Medical Screen Exam Complete:  Yes


Emergency Medical Condition:  Yes


Medical Record Reviewed:  Yes


Differential Diagnosis


Intrauterine pregnancy, urinary tract infection, menometrorrhagia, uterine 

fibroid


Narrative Course


Patient reports to the triage nurse that bleeding began after intercourse. 


Pelvic exam reveals no laceration involving the vaginal mucosa or cervix





Urinalysis shows no UTI and trace RBCs


Urine pregnancy is negative


Follow-up with obstetrics gynecology as needed


Return precautions discussed





Diagnosis





 Primary Impression:  


 Vaginal bleeding


Referrals:  


Alie Wall MD





[facility]


***Med/Other Pt SpecificInfo:  No Change to Meds


Disposition:  01 DISCHARGE HOME


Condition:  Stable











Julian Lainez MD 2018 18:18

## 2018-05-09 ENCOUNTER — HOSPITAL ENCOUNTER (EMERGENCY)
Dept: HOSPITAL 17 - PHED | Age: 38
Discharge: HOME | End: 2018-05-09
Payer: MEDICAID

## 2018-05-09 VITALS — SYSTOLIC BLOOD PRESSURE: 101 MMHG | DIASTOLIC BLOOD PRESSURE: 69 MMHG

## 2018-05-09 VITALS
RESPIRATION RATE: 16 BRPM | TEMPERATURE: 99.4 F | DIASTOLIC BLOOD PRESSURE: 59 MMHG | OXYGEN SATURATION: 100 % | SYSTOLIC BLOOD PRESSURE: 104 MMHG | HEART RATE: 83 BPM

## 2018-05-09 VITALS
OXYGEN SATURATION: 97 % | RESPIRATION RATE: 16 BRPM | DIASTOLIC BLOOD PRESSURE: 62 MMHG | HEART RATE: 89 BPM | SYSTOLIC BLOOD PRESSURE: 99 MMHG

## 2018-05-09 VITALS — BODY MASS INDEX: 19.68 KG/M2 | WEIGHT: 106.92 LBS | HEIGHT: 62 IN

## 2018-05-09 VITALS — OXYGEN SATURATION: 99 %

## 2018-05-09 DIAGNOSIS — F17.200: ICD-10-CM

## 2018-05-09 DIAGNOSIS — F31.9: ICD-10-CM

## 2018-05-09 DIAGNOSIS — Z87.442: ICD-10-CM

## 2018-05-09 DIAGNOSIS — Z88.8: ICD-10-CM

## 2018-05-09 DIAGNOSIS — I10: ICD-10-CM

## 2018-05-09 DIAGNOSIS — R19.7: Primary | ICD-10-CM

## 2018-05-09 DIAGNOSIS — Z88.6: ICD-10-CM

## 2018-05-09 DIAGNOSIS — F41.9: ICD-10-CM

## 2018-05-09 DIAGNOSIS — R10.13: ICD-10-CM

## 2018-05-09 LAB
ALBUMIN SERPL-MCNC: 3.6 GM/DL (ref 3.4–5)
ALP SERPL-CCNC: 63 U/L (ref 45–117)
ALT SERPL-CCNC: 16 U/L (ref 10–53)
AST SERPL-CCNC: 12 U/L (ref 15–37)
BASOPHILS # BLD AUTO: 0 TH/MM3 (ref 0–0.2)
BASOPHILS NFR BLD: 0.7 % (ref 0–2)
BILIRUB SERPL-MCNC: 0.5 MG/DL (ref 0.2–1)
BUN SERPL-MCNC: 14 MG/DL (ref 7–18)
CALCIUM SERPL-MCNC: 8.9 MG/DL (ref 8.5–10.1)
CHLORIDE SERPL-SCNC: 110 MEQ/L (ref 98–107)
COLOR UR: YELLOW
CREAT SERPL-MCNC: 0.75 MG/DL (ref 0.5–1)
EOSINOPHIL # BLD: 0 TH/MM3 (ref 0–0.4)
EOSINOPHIL NFR BLD: 1.1 % (ref 0–4)
ERYTHROCYTE [DISTWIDTH] IN BLOOD BY AUTOMATED COUNT: 11.9 % (ref 11.6–17.2)
GFR SERPLBLD BASED ON 1.73 SQ M-ARVRAT: 87 ML/MIN (ref 89–?)
GLUCOSE SERPL-MCNC: 84 MG/DL (ref 74–106)
GLUCOSE UR STRIP-MCNC: (no result) MG/DL
HCO3 BLD-SCNC: 25.3 MEQ/L (ref 21–32)
HCT VFR BLD CALC: 37.5 % (ref 35–46)
HGB BLD-MCNC: 12.7 GM/DL (ref 11.6–15.3)
HGB UR QL STRIP: (no result)
KETONES UR STRIP-MCNC: (no result) MG/DL
LEUKOCYTE ESTERASE UR QL STRIP: (no result) /HPF (ref 0–5)
LYMPHOCYTES # BLD AUTO: 0.8 TH/MM3 (ref 1–4.8)
LYMPHOCYTES NFR BLD AUTO: 24.8 % (ref 9–44)
MCH RBC QN AUTO: 31.7 PG (ref 27–34)
MCHC RBC AUTO-ENTMCNC: 33.9 % (ref 32–36)
MCV RBC AUTO: 93.5 FL (ref 80–100)
MONOCYTE #: 0.3 TH/MM3 (ref 0–0.9)
MONOCYTES NFR BLD: 9.5 % (ref 0–8)
NEUTROPHILS # BLD AUTO: 2.2 TH/MM3 (ref 1.8–7.7)
NEUTROPHILS NFR BLD AUTO: 63.9 % (ref 16–70)
NITRITE UR QL STRIP: (no result)
PLATELET # BLD: 154 TH/MM3 (ref 150–450)
PMV BLD AUTO: 9.9 FL (ref 7–11)
PROT SERPL-MCNC: 7.2 GM/DL (ref 6.4–8.2)
RBC # BLD AUTO: 4.01 MIL/MM3 (ref 4–5.3)
SODIUM SERPL-SCNC: 140 MEQ/L (ref 136–145)
SP GR UR STRIP: 1.02 (ref 1–1.03)
SQUAMOUS #/AREA URNS HPF: (no result) /HPF (ref 0–5)
URINE LEUKOCYTE ESTERASE: (no result)
WBC # BLD AUTO: 3.3 TH/MM3 (ref 4–11)

## 2018-05-09 PROCEDURE — 84703 CHORIONIC GONADOTROPIN ASSAY: CPT

## 2018-05-09 PROCEDURE — 80053 COMPREHEN METABOLIC PANEL: CPT

## 2018-05-09 PROCEDURE — 85025 COMPLETE CBC W/AUTO DIFF WBC: CPT

## 2018-05-09 PROCEDURE — 81001 URINALYSIS AUTO W/SCOPE: CPT

## 2018-05-09 PROCEDURE — 83690 ASSAY OF LIPASE: CPT

## 2018-05-09 PROCEDURE — 99284 EMERGENCY DEPT VISIT MOD MDM: CPT

## 2018-05-09 PROCEDURE — 96360 HYDRATION IV INFUSION INIT: CPT
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
